# Patient Record
Sex: MALE | Race: WHITE | NOT HISPANIC OR LATINO | URBAN - METROPOLITAN AREA
[De-identification: names, ages, dates, MRNs, and addresses within clinical notes are randomized per-mention and may not be internally consistent; named-entity substitution may affect disease eponyms.]

---

## 2020-01-01 ENCOUNTER — EMERGENCY (EMERGENCY)
Facility: HOSPITAL | Age: 40
LOS: 1 days | Discharge: HOME | End: 2020-01-01
Attending: EMERGENCY MEDICINE
Payer: COMMERCIAL

## 2020-01-01 VITALS
RESPIRATION RATE: 18 BRPM | HEART RATE: 89 BPM | SYSTOLIC BLOOD PRESSURE: 160 MMHG | WEIGHT: 265 LBS | TEMPERATURE: 98 F | DIASTOLIC BLOOD PRESSURE: 100 MMHG

## 2020-01-01 PROCEDURE — 99283 EMERGENCY DEPT VISIT LOW MDM: CPT

## 2020-01-01 NOTE — ED PROVIDER NOTE - LOWER EXTREMITY EXAM, LEFT
Tenderness at the 1st MTP joint, slight decreased ROM of the great toe secondary to pain, no significant gemma tenderness, no significant edema or induration

## 2020-01-01 NOTE — ED ADULT NURSE NOTE - OBJECTIVE STATEMENT
pt is here for leg pain.  pt stated that left foot pain, denied trama or injury, h/o gout, pt calm at this time.

## 2020-01-01 NOTE — ED PROVIDER NOTE - CLINICAL SUMMARY MEDICAL DECISION MAKING FREE TEXT BOX
Pt with gout flare. Provide Prednisone taper and podiatry follow up, pt in agreement with plan will d/c .

## 2020-01-01 NOTE — ED PROVIDER NOTE - PATIENT PORTAL LINK FT
You can access the FollowMyHealth Patient Portal offered by [facility] by registering at the following website: [email or web address]. By joining [facility]’s FollowMyHealth portal, you will also be able to view your health information using other applications (apps) compatible with our system.

## 2020-01-01 NOTE — ED ADULT NURSE NOTE - NSIMPLEMENTINTERV_GEN_ALL_ED
Implemented All Universal Safety Interventions:  [place] to call system. Call bell, personal items and telephone within reach. Instruct patient to call for assistance. Room bathroom lighting operational. Non-slip footwear when patient is off stretcher. Physically safe environment: no spills, clutter or unnecessary equipment. Stretcher in lowest position, wheels locked, appropriate side rails in place.

## 2020-01-01 NOTE — ED PROVIDER NOTE - OBJECTIVE STATEMENT
38 y/o M patient with 40 y/o M patient with a significant PMHx of Gout and no significant PSHx presents to the ED with pain to the left great toe. Patient states for x3 days he has had left great toe pain that feels similar to his last gout flare but not as intense. Patient denies falls or injuries. Patient adds he is taking Indomethacin 50mg every x3 hrs with no relief. Patient denies fever, chills, weakness or numbness of the foot, and any other complaints. Patient denies taking any other medications but notes using Naproxen. NKDA.

## 2023-02-06 ENCOUNTER — HOSPITAL ENCOUNTER (EMERGENCY)
Facility: HOSPITAL | Age: 43
Discharge: HOME | End: 2023-02-06
Attending: EMERGENCY MEDICINE

## 2023-02-06 ENCOUNTER — APPOINTMENT (EMERGENCY)
Dept: RADIOLOGY | Facility: HOSPITAL | Age: 43
End: 2023-02-06

## 2023-02-06 VITALS
RESPIRATION RATE: 18 BRPM | TEMPERATURE: 97.9 F | OXYGEN SATURATION: 98 % | DIASTOLIC BLOOD PRESSURE: 103 MMHG | SYSTOLIC BLOOD PRESSURE: 162 MMHG | HEART RATE: 79 BPM | WEIGHT: 265 LBS

## 2023-02-06 DIAGNOSIS — Z76.0 ENCOUNTER FOR MEDICATION REFILL: ICD-10-CM

## 2023-02-06 DIAGNOSIS — M79.672 LEFT FOOT PAIN: Primary | ICD-10-CM

## 2023-02-06 RX ORDER — KETOROLAC TROMETHAMINE 30 MG/ML
30 INJECTION, SOLUTION INTRAMUSCULAR; INTRAVENOUS ONCE
Status: COMPLETED | OUTPATIENT
Start: 2023-02-06 | End: 2023-02-06

## 2023-02-06 RX ORDER — OMEPRAZOLE 20 MG/1
20 CAPSULE, DELAYED RELEASE ORAL DAILY
Qty: 30 CAPSULE | Refills: 0 | Status: SHIPPED | OUTPATIENT
Start: 2023-02-06

## 2023-02-06 RX ORDER — NAPROXEN 500 MG/1
500 TABLET ORAL 2 TIMES DAILY WITH MEALS
Qty: 30 TABLET | Refills: 0 | Status: SHIPPED | OUTPATIENT
Start: 2023-02-06

## 2023-02-06 RX ADMIN — KETOROLAC TROMETHAMINE 30 MG: 30 INJECTION, SOLUTION INTRAMUSCULAR at 11:23

## 2023-02-06 NOTE — Clinical Note
Neisha Scottmckayla was seen and treated in our emergency department on 2023  No restrictions            Diagnosis:     Shalini   may return to work on return date  He may return on this date: 2023         If you have any questions or concerns, please don't hesitate to call        Cuca Ocampo PA-C    ______________________________           _______________          _______________  Hospital Representative                              Date                                Time

## 2023-02-06 NOTE — DISCHARGE INSTRUCTIONS
Take naproxen as prescribed  Do not take meloxicam while taking naproxen  Elevate your foot, ice, compress, and rest     Please follow-up with your family doctor and orthopedics if symptoms persist  Return to the ER with any worsening symptoms or fevers

## 2023-02-06 NOTE — ED PROVIDER NOTES
History  Chief Complaint   Patient presents with   • Foot Pain     C/o left foot pain since last night, not sure if he injured it  35yo male with a history of gout presenting for evaluation of left foot pain x 1 day  He denies any trauma to the area and states he woke up from a nap yesterday with the pain  The pain is throbbing, moderate in intensity, and worse with weight bearing  He is unsure if this is related to gout  No fevers, chills, paresthesias  History provided by:  Patient   used: No    Medical Problem  Location:  L foot  Quality:  Pain  Severity:  Moderate  Onset quality:  Gradual  Duration:  1 day  Timing:  Constant  Progression:  Worsening  Chronicity:  New  Context:  No trauma  Associated symptoms: no fever        None       History reviewed  No pertinent past medical history  History reviewed  No pertinent surgical history  History reviewed  No pertinent family history  I have reviewed and agree with the history as documented  E-Cigarette/Vaping     E-Cigarette/Vaping Substances     Social History     Tobacco Use   • Smoking status: Never   • Smokeless tobacco: Never   Substance Use Topics   • Alcohol use: Never   • Drug use: Never       Review of Systems   Constitutional: Negative for chills and fever  Eyes: Negative for discharge and redness  Musculoskeletal: Positive for arthralgias  Negative for joint swelling  Skin: Negative for color change and wound  Neurological: Negative for weakness and numbness  Psychiatric/Behavioral: Negative for confusion  The patient is not nervous/anxious  All other systems reviewed and are negative  Physical Exam  Physical Exam  Vitals and nursing note reviewed  Constitutional:       General: He is not in acute distress  Appearance: Normal appearance  He is not toxic-appearing  HENT:      Head: Normocephalic and atraumatic        Right Ear: External ear normal       Left Ear: External ear normal  Eyes:      General: No scleral icterus  Right eye: No discharge  Left eye: No discharge  Conjunctiva/sclera: Conjunctivae normal    Cardiovascular:      Rate and Rhythm: Normal rate  Pulmonary:      Effort: Pulmonary effort is normal  No respiratory distress  Breath sounds: No stridor  Musculoskeletal:         General: No deformity  Normal range of motion  Cervical back: Normal range of motion  Feet:      Comments: Left foot: No skin changes or deformity noted  Mild swelling over the dorsum of the foot  No ankle effusion noted  ROM mildly decreased 2/2 pain  2+ DP pulse and sensation intact  Skin:     General: Skin is warm and dry  Neurological:      General: No focal deficit present  Mental Status: He is alert  Mental status is at baseline  Psychiatric:         Mood and Affect: Mood normal          Behavior: Behavior normal          Vital Signs  ED Triage Vitals [02/06/23 1010]   Temperature Pulse Respirations Blood Pressure SpO2   97 9 °F (36 6 °C) 79 18 (!) 162/103 98 %      Temp Source Heart Rate Source Patient Position - Orthostatic VS BP Location FiO2 (%)   Tympanic Monitor Sitting Left arm --      Pain Score       --           Vitals:    02/06/23 1010   BP: (!) 162/103   Pulse: 79   Patient Position - Orthostatic VS: Sitting         Visual Acuity      ED Medications  Medications   ketorolac (TORADOL) injection 30 mg (30 mg Intramuscular Given 2/6/23 1123)       Diagnostic Studies  Results Reviewed     None                 XR foot 3+ views LEFT   ED Interpretation by Yvonne Manzo PA-C (02/06 1108)   No acute fracture      Final Result by Chato Garza MD (02/06 1321)   Heel spur      No acute osseous abnormality              Workstation performed: [id number]         XR ankle 3+ views LEFT   ED Interpretation by Yvonne Manzo PA-C (02/06 1108)   No acute fracture      Final Result by Chato Garza MD (02/06 1322)   Heel spur      No acute osseous abnormality  Workstation performed: [id number]                    Procedures  Procedures         ED Course                     Medical Decision Making  11HCP presenting for atraumatic L foot pain x 1 day  Hx of gout  No ankle effusion on exam  There is mild swelling of dorsum of foot  No skin changes or wounds  LLE is neurovascularly intact  X-rays of left foot and ankle obtained which are negative for fracture  Will prescribe naproxen  Advised RICE  Advised f/u with PCP and orthopedics  ED return precautions discussed  Patient expressed understanding and is agreeable to plan  Patient discharged in stable condition  Encounter for medication refill: acute illness or injury  Left foot pain: acute illness or injury  Amount and/or Complexity of Data Reviewed  Radiology: ordered and independent interpretation performed  Risk  Prescription drug management  Disposition  Final diagnoses:   Left foot pain   Encounter for medication refill     Time reflects when diagnosis was documented in both MDM as applicable and the Disposition within this note     Time User Action Codes Description Comment    2/6/2023 11:09 AM Harriett Carlos Add [K12 964] Left foot pain     2/6/2023 11:10 AM Shy Carlos Add [Z76 0] Encounter for medication refill       ED Disposition     ED Disposition   Discharge    Condition   Stable    Date/Time   Mon Feb 6, 2023 11:09 AM    Comment   [address] discharge to home/self care                 Follow-up Information     Follow up With Specialties Details Why Contact Info Additional [address] [facility] Specialists [facility] Schedule an appointment as soon as possible for a visit   [facility] [phone] [address] [facility], [address] [address] [place], [place], [phone]    [facility] Emergency Department Emergency Medicine  If symptoms worsen [address] Emergency Department, [address] [place] [address], [place], [place], [address]          Discharge Medication List as of 2/6/2023 11:11 AM      START taking these medications    Details   naproxen (Naprosyn) 500 mg tablet Take 1 tablet (500 mg total) by mouth 2 (two) times a day with meals, Starting Mon 2/6/2023, Normal      omeprazole (PriLOSEC) 20 mg delayed release capsule Take 1 capsule (20 mg total) by mouth daily, Starting Mon 2/6/2023, Normal             No discharge procedures on file      PDMP Review     None          ED Provider  Electronically Signed by           Nayan Brooks PA-C  02/06/23 4750

## 2024-02-13 ENCOUNTER — HOSPITAL ENCOUNTER (EMERGENCY)
Facility: HOSPITAL | Age: 44
Discharge: HOME | End: 2024-02-13
Attending: EMERGENCY MEDICINE
Payer: COMMERCIAL

## 2024-02-13 VITALS
TEMPERATURE: 97.9 F | DIASTOLIC BLOOD PRESSURE: 75 MMHG | OXYGEN SATURATION: 98 % | RESPIRATION RATE: 20 BRPM | SYSTOLIC BLOOD PRESSURE: 99 MMHG | HEART RATE: 104 BPM

## 2024-02-13 DIAGNOSIS — M10.9 GOUT ATTACK: Primary | ICD-10-CM

## 2024-02-13 PROCEDURE — 96372 THER/PROPH/DIAG INJ SC/IM: CPT

## 2024-02-13 PROCEDURE — 99282 EMERGENCY DEPT VISIT SF MDM: CPT

## 2024-02-13 PROCEDURE — 99284 EMERGENCY DEPT VISIT MOD MDM: CPT | Performed by: EMERGENCY MEDICINE

## 2024-02-13 RX ORDER — DEXAMETHASONE SODIUM PHOSPHATE 10 MG/ML
8 INJECTION, SOLUTION INTRAMUSCULAR; INTRAVENOUS ONCE
Status: COMPLETED | OUTPATIENT
Start: 2024-02-13 | End: 2024-02-13

## 2024-02-13 RX ORDER — ALLOPURINOL 100 MG/1
100 TABLET ORAL DAILY
Qty: 30 TABLET | Refills: 0 | Status: SHIPPED | OUTPATIENT
Start: 2024-02-13 | End: 2024-03-14

## 2024-02-13 RX ORDER — COLCHICINE 0.6 MG/1
0.6 TABLET ORAL 2 TIMES DAILY
Qty: 10 TABLET | Refills: 0 | Status: SHIPPED | OUTPATIENT
Start: 2024-02-13 | End: 2024-02-18

## 2024-02-13 RX ORDER — PREDNISONE 50 MG/1
50 TABLET ORAL DAILY
Qty: 5 TABLET | Refills: 0 | Status: SHIPPED | OUTPATIENT
Start: 2024-02-14

## 2024-02-13 RX ORDER — COLCHICINE 0.6 MG/1
1.2 TABLET ORAL ONCE
Status: COMPLETED | OUTPATIENT
Start: 2024-02-13 | End: 2024-02-13

## 2024-02-13 RX ORDER — INDOMETHACIN 25 MG/1
25 CAPSULE ORAL 3 TIMES DAILY PRN
Qty: 30 CAPSULE | Refills: 0 | Status: SHIPPED | OUTPATIENT
Start: 2024-02-13

## 2024-02-13 RX ADMIN — DEXAMETHASONE SODIUM PHOSPHATE 8 MG: 10 INJECTION, SOLUTION INTRAMUSCULAR; INTRAVENOUS at 14:39

## 2024-02-13 RX ADMIN — COLCHICINE 1.2 MG: 0.6 TABLET ORAL at 14:39

## 2024-02-13 NOTE — ED PROVIDER NOTES
History  Chief Complaint   Patient presents with    Foot Pain     Left foot pain and swelling to left foot. Hx of gout.      Vic Pereira is a 43 y.o.  year old male  History reviewed. No pertinent past medical history.  Social History    Tobacco Use      Smoking status: Never      Smokeless tobacco: Never    Alcohol use: Never    Drug use: Never    Patient presents with:  Foot Pain: Left foot pain and swelling to left foot. Hx of gout.   He is a  presents emergency department with left foot pain at the level of the first MTP joint.  He has had previous history of gout treated with medications.  Usually gets better.  But he is never been on preventative medicine.  He was very inflamed and tender this morning a little bit better now after elevation and rest    History obtained directly from the PATIENT              History provided by:  Patient   used: No        Prior to Admission Medications   Prescriptions Last Dose Informant Patient Reported? Taking?   naproxen (Naprosyn) 500 mg tablet   No No   Sig: Take 1 tablet (500 mg total) by mouth 2 (two) times a day with meals   omeprazole (PriLOSEC) 20 mg delayed release capsule   No No   Sig: Take 1 capsule (20 mg total) by mouth daily      Facility-Administered Medications: None       History reviewed. No pertinent past medical history.    History reviewed. No pertinent surgical history.    History reviewed. No pertinent family history.  I have reviewed and agree with the history as documented.    E-Cigarette/Vaping     E-Cigarette/Vaping Substances     Social History     Tobacco Use    Smoking status: Never    Smokeless tobacco: Never   Substance Use Topics    Alcohol use: Never    Drug use: Never       Review of Systems   Constitutional:  Negative for chills and fever.   HENT:  Negative for ear pain and sore throat.    Eyes:  Negative for pain and visual disturbance.   Respiratory:  Negative for cough and shortness of breath.     Cardiovascular:  Negative for chest pain and palpitations.   Gastrointestinal:  Negative for abdominal pain and vomiting.   Genitourinary:  Negative for dysuria and hematuria.   Musculoskeletal:  Positive for arthralgias and gait problem. Negative for back pain.   Skin:  Negative for color change and rash.   Neurological:  Negative for seizures and syncope.   All other systems reviewed and are negative.      Physical Exam  Physical Exam  Vitals reviewed.   Constitutional:       General: He is not in acute distress.     Appearance: Normal appearance. He is not ill-appearing.   HENT:      Head: Atraumatic.      Right Ear: External ear normal.      Left Ear: External ear normal.      Mouth/Throat:      Mouth: Mucous membranes are moist.   Eyes:      Pupils: Pupils are equal, round, and reactive to light.   Cardiovascular:      Rate and Rhythm: Normal rate.   Pulmonary:      Effort: Pulmonary effort is normal.   Musculoskeletal:      Left foot: Swelling, foot drop and tenderness (At the MTP) present. No deformity, bunion or Charcot foot.   Skin:     General: Skin is warm and dry.   Neurological:      General: No focal deficit present.      Mental Status: He is alert and oriented to person, place, and time.   Psychiatric:         Mood and Affect: Mood normal.         Thought Content: Thought content normal.         Vital Signs  ED Triage Vitals [02/13/24 1418]   Temperature Pulse Respirations Blood Pressure SpO2   97.9 °F (36.6 °C) 104 20 99/75 98 %      Temp Source Heart Rate Source Patient Position - Orthostatic VS BP Location FiO2 (%)   Temporal Monitor Sitting Left arm --      Pain Score       --           Vitals:    02/13/24 1418   BP: 99/75   Pulse: 104   Patient Position - Orthostatic VS: Sitting         Visual Acuity      ED Medications  Medications   dexamethasone (PF) (DECADRON) injection 8 mg (has no administration in time range)   colchicine (COLCRYS) tablet 1.2 mg (has no administration in time range)        Diagnostic Studies  Results Reviewed       None                   No orders to display              Procedures  Procedures         ED Course                                             Medical Decision Making  Problems Addressed:  Gout attack: acute illness or injury    Amount and/or Complexity of Data Reviewed  Discussion of management or test interpretation with external provider(s): Patient clinical presentation is benign.    Meaning patient's vital signs are normal and stable ED Triage Vitals [02/13/24 1418]  Temperature: 97.9 °F (36.6 °C)  Pulse: 104  Respirations: 20  Blood Pressure: 99/75  SpO2: 98 %  Temp Source: Temporal  Heart Rate Source: Monitor  Patient Position - Orthostatic VS: Sitting  BP Location: Left arm  FiO2 (%): n/a  Pain Score: n/a.    Patient in no distress.    Chief complaint, vital signs, physical examination does not suggest an acute medical emergency at this time.       Risk  OTC drugs.  Prescription drug management.             Disposition  Final diagnoses:   Gout attack     Time reflects when diagnosis was documented in both MDM as applicable and the Disposition within this note       Time User Action Codes Description Comment    2/13/2024  2:34 PM Todd Foy [M10.9] Gout attack           ED Disposition       ED Disposition   Discharge    Condition   Stable    Date/Time   Tue Feb 13, 2024  2:34 PM    Comment   Vic Pereira discharge to home/self care.                   Follow-up Information    None         Patient's Medications   Discharge Prescriptions    ALLOPURINOL (ZYLOPRIM) 100 MG TABLET    Take 1 tablet (100 mg total) by mouth daily       Start Date: 2/13/2024 End Date: 3/14/2024       Order Dose: 100 mg       Quantity: 30 tablet    Refills: 0    COLCHICINE (COLCRYS) 0.6 MG TABLET    Take 1 tablet (0.6 mg total) by mouth 2 (two) times a day for 5 days       Start Date: 2/13/2024 End Date: 2/18/2024       Order Dose: 0.6 mg       Quantity: 10 tablet    Refills: 0     INDOMETHACIN (INDOCIN) 25 MG CAPSULE    Take 1 capsule (25 mg total) by mouth 3 (three) times a day as needed for mild pain       Start Date: 2/13/2024 End Date: --       Order Dose: 25 mg       Quantity: 30 capsule    Refills: 0    PREDNISONE 50 MG TABLET    Take 1 tablet (50 mg total) by mouth daily Do not start before February 14, 2024.       Start Date: 2/14/2024 End Date: --       Order Dose: 50 mg       Quantity: 5 tablet    Refills: 0       No discharge procedures on file.    PDMP Review       None            ED Provider  Electronically Signed by             Todd Foy MD  02/13/24 7558

## 2024-02-13 NOTE — DISCHARGE INSTRUCTIONS
A  personal message from Dr. Todd Foy,  Thank you so much for allowing me to care for you today.    I pride myself in the care and attention I give all my patients.  I hope you were a witness to this tonight.   If for any reason your condition does not improve or worsens, or you have a question that was not answered during your visit you can feel free to text me on my personal phone #  # [phone].   I will answer to your message and continue your care past your emergency room visit.     Please understand that although you are being discharged because your condition has been deemed stable and able to be managed on an outpatient setting. However your condition may worsen as part of the natural progression of the illness/condition, if this occurs please come back to the emergency department for a repeat evaluation.

## 2024-02-13 NOTE — Clinical Note
Vic Pereira was seen and treated in our emergency department on 2/13/2024.                Diagnosis: Gout Attack    Vic  may return to work on return date.    He may return on this date: 02/19/2024         If you have any questions or concerns, please don't hesitate to call.      Todd Foy MD    ______________________________           _______________          _______________  Hospital Representative                              Date                                Time